# Patient Record
Sex: FEMALE | Race: WHITE | NOT HISPANIC OR LATINO
[De-identification: names, ages, dates, MRNs, and addresses within clinical notes are randomized per-mention and may not be internally consistent; named-entity substitution may affect disease eponyms.]

---

## 2018-04-12 ENCOUNTER — CHARTING TRANS (OUTPATIENT)
Dept: OTHER | Age: 23
End: 2018-04-12

## 2018-11-01 VITALS
TEMPERATURE: 98.2 F | DIASTOLIC BLOOD PRESSURE: 89 MMHG | BODY MASS INDEX: 29.11 KG/M2 | HEART RATE: 97 BPM | HEIGHT: 65 IN | OXYGEN SATURATION: 99 % | SYSTOLIC BLOOD PRESSURE: 143 MMHG | WEIGHT: 174.71 LBS | RESPIRATION RATE: 16 BRPM

## 2019-01-10 ENCOUNTER — WALK IN (OUTPATIENT)
Dept: URGENT CARE | Age: 24
End: 2019-01-10

## 2019-01-10 VITALS
BODY MASS INDEX: 28.28 KG/M2 | TEMPERATURE: 97.4 F | HEART RATE: 70 BPM | RESPIRATION RATE: 16 BRPM | WEIGHT: 176 LBS | SYSTOLIC BLOOD PRESSURE: 110 MMHG | HEIGHT: 66 IN | DIASTOLIC BLOOD PRESSURE: 70 MMHG

## 2019-01-10 DIAGNOSIS — Z02.1 PHYSICAL EXAM, PRE-EMPLOYMENT: Primary | ICD-10-CM

## 2019-01-10 PROBLEM — L40.50 PSORIASIS WITH ARTHROPATHY (CMD): Status: ACTIVE | Noted: 2018-04-12

## 2019-01-10 PROBLEM — M45.A0 NON-RADIOGRAPHIC AXIAL SPONDYLOARTHRITIS (CMD): Status: ACTIVE | Noted: 2018-04-12

## 2019-01-10 PROBLEM — M45.9 ANKYLOSING SPONDYLITIS (CMD): Status: ACTIVE | Noted: 2018-04-12

## 2019-01-10 PROCEDURE — X0945 SELF PAY APN OR PA PERFORMED ADMINISTRATIVE PHYSICAL: HCPCS | Performed by: NURSE PRACTITIONER

## 2019-01-10 RX ORDER — LANOLIN ALCOHOL/MO/W.PET/CERES
1 CREAM (GRAM) TOPICAL DAILY
COMMUNITY

## 2019-01-10 RX ORDER — TRIAMCINOLONE ACETONIDE 1 MG/G
1 CREAM TOPICAL PRN
COMMUNITY

## 2019-01-10 RX ORDER — ALBUTEROL SULFATE 90 UG/1
1 AEROSOL, METERED RESPIRATORY (INHALATION) PRN
COMMUNITY

## 2019-01-10 RX ORDER — METHOTREXATE 25 MG/ML
1 INJECTION INTRA-ARTERIAL; INTRAMUSCULAR; INTRATHECAL; INTRAVENOUS
COMMUNITY

## 2019-01-10 SDOH — HEALTH STABILITY: MENTAL HEALTH: HOW OFTEN DO YOU HAVE A DRINK CONTAINING ALCOHOL?: NEVER

## 2019-01-10 ASSESSMENT — ENCOUNTER SYMPTOMS
ABDOMINAL PAIN: 0
EYE PAIN: 0
NAUSEA: 0
ADENOPATHY: 0
COLOR CHANGE: 0
APPETITE CHANGE: 0
FACIAL SWELLING: 0
FATIGUE: 0
DIZZINESS: 0
NUMBNESS: 0
WHEEZING: 0
WOUND: 0
SINUS PAIN: 0
CHOKING: 0
BACK PAIN: 1
POLYPHAGIA: 0
COUGH: 0
CONSTIPATION: 0
RHINORRHEA: 0
SINUS PRESSURE: 0
EYE REDNESS: 0
AGITATION: 0
POLYDIPSIA: 0
TROUBLE SWALLOWING: 0
SLEEP DISTURBANCE: 0
VOICE CHANGE: 0
HEADACHES: 0
PHOTOPHOBIA: 0
DIAPHORESIS: 0
FEVER: 0
SORE THROAT: 0
WEAKNESS: 0
VOMITING: 0
LIGHT-HEADEDNESS: 0
EYE DISCHARGE: 0
BRUISES/BLEEDS EASILY: 0
SHORTNESS OF BREATH: 0
EYE ITCHING: 0
CHEST TIGHTNESS: 0
DIARRHEA: 0
CHILLS: 0

## 2019-07-11 ENCOUNTER — TELEPHONE (OUTPATIENT)
Dept: PHARMACY | Facility: CLINIC | Age: 24
End: 2019-07-11

## 2019-07-11 ENCOUNTER — HOSPITAL ENCOUNTER (EMERGENCY)
Facility: HOSPITAL | Age: 24
Discharge: HOME | End: 2019-07-11
Attending: EMERGENCY MEDICINE

## 2019-07-11 VITALS
HEIGHT: 65 IN | SYSTOLIC BLOOD PRESSURE: 138 MMHG | HEART RATE: 81 BPM | DIASTOLIC BLOOD PRESSURE: 97 MMHG | OXYGEN SATURATION: 99 % | BODY MASS INDEX: 29.99 KG/M2 | RESPIRATION RATE: 16 BRPM | TEMPERATURE: 99 F | WEIGHT: 180 LBS

## 2019-07-11 DIAGNOSIS — Z76.0 ENCOUNTER FOR MEDICATION REFILL: ICD-10-CM

## 2019-07-11 DIAGNOSIS — R07.9 CHEST PAIN: Primary | ICD-10-CM

## 2019-07-11 DIAGNOSIS — F41.0 PANIC ATTACK AS REACTION TO STRESS: ICD-10-CM

## 2019-07-11 DIAGNOSIS — F43.0 PANIC ATTACK AS REACTION TO STRESS: ICD-10-CM

## 2019-07-11 PROCEDURE — 99284 EMERGENCY DEPT VISIT MOD MDM: CPT

## 2019-07-11 PROCEDURE — 25000003 PHARM REV CODE 250: Performed by: EMERGENCY MEDICINE

## 2019-07-11 PROCEDURE — 93005 ELECTROCARDIOGRAM TRACING: CPT

## 2019-07-11 PROCEDURE — 93010 EKG 12-LEAD: ICD-10-PCS | Mod: ,,, | Performed by: INTERNAL MEDICINE

## 2019-07-11 PROCEDURE — 93010 ELECTROCARDIOGRAM REPORT: CPT | Mod: ,,, | Performed by: INTERNAL MEDICINE

## 2019-07-11 PROCEDURE — 99284 EMERGENCY DEPT VISIT MOD MDM: CPT | Mod: ,,, | Performed by: EMERGENCY MEDICINE

## 2019-07-11 PROCEDURE — 99284 PR EMERGENCY DEPT VISIT,LEVEL IV: ICD-10-PCS | Mod: ,,, | Performed by: EMERGENCY MEDICINE

## 2019-07-11 RX ORDER — HYDROXYZINE PAMOATE 25 MG/1
50 CAPSULE ORAL
Status: COMPLETED | OUTPATIENT
Start: 2019-07-11 | End: 2019-07-11

## 2019-07-11 RX ORDER — METHOTREXATE 25 MG/ML
25 INJECTION INTRA-ARTERIAL; INTRAMUSCULAR; INTRATHECAL; INTRAVENOUS
COMMUNITY

## 2019-07-11 RX ORDER — FOLIC ACID 0.4 MG
1 TABLET ORAL 4 TIMES DAILY
COMMUNITY

## 2019-07-11 RX ORDER — FOLIC ACID 1 MG/1
4 TABLET ORAL DAILY
Qty: 120 TABLET | Refills: 0 | Status: SHIPPED | OUTPATIENT
Start: 2019-07-11 | End: 2019-08-10

## 2019-07-11 RX ORDER — HYDROXYZINE HYDROCHLORIDE 25 MG/1
50 TABLET, FILM COATED ORAL 4 TIMES DAILY PRN
Qty: 30 TABLET | Refills: 0 | Status: SHIPPED | OUTPATIENT
Start: 2019-07-11

## 2019-07-11 RX ADMIN — HYDROXYZINE PAMOATE 50 MG: 25 CAPSULE ORAL at 02:07

## 2019-07-11 NOTE — TELEPHONE ENCOUNTER
LVM for callback to inform patient that [facility] received prescription for Taltz & Otrexup and prior authorization is required.  [facility] will be back in touch once insurance determination is received.

## 2019-07-11 NOTE — EKG INTERPRETATIONS - EMERGENCY DEPT.
Independently interpreted by MD:  Rate 69, NSR, no stemi, no ectopy, no hypertrophy, poor qrs progression in V3-4

## 2019-07-11 NOTE — ED PROVIDER NOTES
Encounter Date: 7/11/2019    SCRIBE #1 NOTE: I, Elicia Lemos, am scribing for, and in the presence of,  Dr. Petty. I have scribed the following portions of the note - Other sections scribed: HPI, ROS, PE.       History     Chief Complaint   Patient presents with    Panic Attack     Pt reports frequent panic attacks.  She is here with mother in hospital and states she can't get her methotrexate for auto immune disease..  Pt is from [place] and pharmacy does not have the medication      23 year old female with a past medical history of rheumatoid arthritis and psoriasis with complaints of panic attacks, nausea, vomiting, abdominal pain, and diarrhea. The patient reports that her symptoms began after her mother was hospitalized in the ICU six days ago for a PE. Patient states that she has had a total of 6 panic attacks since her mother's hospitalization began. She states that she has also had trouble sleeping and eating because she has been concerned about her mother's condition. She also endorses chest pain and shortness of breath that began yesterday. She denies a personal history of blood clots but reports a family history of blood clots. The patient also has rheumatoid arthritis and states that she is due for her medications. Patient denies cough, dysuria, and hematemesis.  No smoking or OCP use.  No unilateral leg swelling.  A ten point review of systems was completed and is negative except as documented above.  Patient denies any other acute medical complaint.  The patients available PMH, PSH, Social History, medications, allergies, and triage vital signs were reviewed just prior to their medical evaluation.          The history is provided by the patient and medical records.     Review of patient's allergies indicates:  No Known Allergies  Past Medical History:   Diagnosis Date    Arthritis, rheumatoid     Psoriasis      History reviewed. No pertinent surgical history.  History reviewed. No pertinent family  history.  Social History     Tobacco Use    Smoking status: Not on file   Substance Use Topics    Alcohol use: Not on file    Drug use: Not on file     Review of Systems   Constitutional: Negative for fever.   HENT: Negative for sore throat.    Eyes: Negative for visual disturbance.   Respiratory: Positive for shortness of breath. Negative for cough.    Cardiovascular: Positive for chest pain.   Gastrointestinal: Positive for abdominal pain, diarrhea and nausea. Negative for vomiting.   Genitourinary: Negative for dysuria.   Musculoskeletal: Negative for neck pain.   Skin: Negative for rash and wound.   Allergic/Immunologic: Negative for immunocompromised state.   Neurological: Negative for syncope.   Psychiatric/Behavioral: Negative for confusion. The patient is nervous/anxious.         Panic attack.   All other systems reviewed and are negative.      Physical Exam     Initial Vitals [07/11/19 1308]   BP Pulse Resp Temp SpO2   (!) 138/97 81 16 98.9 °F (37.2 °C) 99 %      MAP       --         Physical Exam    Nursing note and vitals reviewed.  Constitutional: She appears well-developed and well-nourished. She is not diaphoretic. No distress.   HENT:   Head: Normocephalic and atraumatic.   Nose: Nose normal.   Eyes: Conjunctivae are normal. Right eye exhibits no discharge. Left eye exhibits no discharge.   Neck: Normal range of motion. Neck supple.   Cardiovascular: Normal rate, regular rhythm and normal heart sounds. Exam reveals no gallop and no friction rub.    No murmur heard.  Pulmonary/Chest: Breath sounds normal. No respiratory distress. She has no wheezes. She has no rhonchi. She has no rales.   Abdominal: Soft. She exhibits no distension. There is no tenderness. There is no rebound, no guarding, no tenderness at McBurney's point and negative Glover's sign.   Musculoskeletal: Normal range of motion. She exhibits no edema or tenderness.   Neurological: She is alert and oriented to person, place, and time.  GCS score is 15. GCS eye subscore is 4. GCS verbal subscore is 5. GCS motor subscore is 6.   Skin: Skin is warm and dry. No rash noted. No erythema.   Psychiatric: She has a normal mood and affect. Her behavior is normal. Judgment and thought content normal.         ED Course   Procedures  Labs Reviewed - No data to display  EKG Readings: (Independently Interpreted)   See separate note       Imaging Results    None          Medical Decision Making:   History:   Old Medical Records: I decided to obtain old medical records.  Independently Interpreted Test(s):   I have ordered and independently interpreted EKG Reading(s) - see prior notes  Clinical Tests:   Medical Tests: Ordered and Reviewed  ED Management:  23-year-old female with a history of rheumatoid arthritis and psoriasis presents with panic attacks, chest pain, shortness of breath, and not needing a medication refill.  Vitals unremarkable. Physical exam benign.  EKG unremarkable. We had an extensive conversation at bedside.  Patient initially wanted labs to check for a blood clot.  A full workup was ordered.  She changed her mind and declined labs.  Counseled at bedside.  She has full decision-making capacity.  She understands the risks and benefits.  I think PE or ACS or unlikely.  Refilled her medications at her current dose and schedule.  She is staying with the patient here in the hospital.  She will return to the emergency department if she desires further workup.  Patient will return to ED for worsening symptoms, inability to eat/drink, fever greater than 100.4, or any other concerns.  Did bedside teaching with return precautions.  All questions answered.  The patient acknowledges understanding.  Gave verbal discharge instructions.            Scribe Attestation:   Scribe #1: I performed the above scribed service and the documentation accurately describes the services I performed. I attest to the accuracy of the note.               Clinical Impression:        ICD-10-CM ICD-9-CM   1. Chest pain R07.9 786.50   2. Encounter for medication refill Z76.0 V68.1   3. Panic attack as reaction to stress F41.0 308.0    F43.0          Disposition:   Disposition: Discharged  Condition: Stable                        Christian Petty MD  07/11/19 [id number]

## 2019-07-11 NOTE — ED NOTES
Discharge home with family, states understanding to follow up as directed. Ambulates out of ED without difficulty. RX given, Lengthy discussion with patient and family regarding POC, meds rx.

## 2019-07-11 NOTE — ED TRIAGE NOTES
Patient in for eval of panic attacks. Patient reports that she has been out of her methotrexate (was supposed to take it Wednesday) and is supposed to take her Taltz shortly, but is unable to get either medication at this time. Patient reports frequent panic attacks since her mom has been in the ICU (6 days). Reports nausea, vomiting, chest pain, abdominal pain, palpitations, and SOB, mostly during her panic attacks. Patient reports she has never taken anything for anxiety/panic attacks in the past.

## 2019-07-12 ENCOUNTER — TELEPHONE (OUTPATIENT)
Dept: PHARMACY | Facility: CLINIC | Age: 24
End: 2019-07-12

## 2019-08-06 ENCOUNTER — WALK IN (OUTPATIENT)
Dept: URGENT CARE | Age: 24
End: 2019-08-06

## 2019-08-06 VITALS
DIASTOLIC BLOOD PRESSURE: 84 MMHG | HEART RATE: 71 BPM | SYSTOLIC BLOOD PRESSURE: 120 MMHG | HEIGHT: 66 IN | BODY MASS INDEX: 31.91 KG/M2 | OXYGEN SATURATION: 98 % | RESPIRATION RATE: 18 BRPM | WEIGHT: 198.52 LBS | TEMPERATURE: 98.2 F

## 2019-08-06 DIAGNOSIS — Z02.1 PRE-EMPLOYMENT HEALTH SCREENING EXAMINATION: Primary | ICD-10-CM

## 2019-08-06 PROCEDURE — X0945 SELF PAY APN OR PA PERFORMED ADMINISTRATIVE PHYSICAL: HCPCS | Performed by: NURSE PRACTITIONER

## 2019-08-06 RX ORDER — FOLIC ACID 1 MG/1
4 TABLET ORAL DAILY
COMMUNITY

## 2019-08-06 RX ORDER — METHOTREXATE 25 MG/ML
25 INJECTION INTRA-ARTERIAL; INTRAMUSCULAR; INTRATHECAL; INTRAVENOUS
COMMUNITY

## 2019-08-06 ASSESSMENT — ENCOUNTER SYMPTOMS
RESPIRATORY NEGATIVE: 1
CONSTITUTIONAL NEGATIVE: 1
EYES NEGATIVE: 1
GASTROINTESTINAL NEGATIVE: 1
PSYCHIATRIC NEGATIVE: 1
NEUROLOGICAL NEGATIVE: 1
HEMATOLOGIC/LYMPHATIC NEGATIVE: 1